# Patient Record
Sex: MALE | Employment: FULL TIME | URBAN - METROPOLITAN AREA
[De-identification: names, ages, dates, MRNs, and addresses within clinical notes are randomized per-mention and may not be internally consistent; named-entity substitution may affect disease eponyms.]

---

## 2020-07-09 ENCOUNTER — LAB VISIT (OUTPATIENT)
Dept: PRIMARY CARE CLINIC | Facility: OTHER | Age: 30
End: 2020-07-09
Attending: INTERNAL MEDICINE
Payer: COMMERCIAL

## 2020-07-09 DIAGNOSIS — R05.9 COUGH: ICD-10-CM

## 2020-07-09 PROCEDURE — U0003 INFECTIOUS AGENT DETECTION BY NUCLEIC ACID (DNA OR RNA); SEVERE ACUTE RESPIRATORY SYNDROME CORONAVIRUS 2 (SARS-COV-2) (CORONAVIRUS DISEASE [COVID-19]), AMPLIFIED PROBE TECHNIQUE, MAKING USE OF HIGH THROUGHPUT TECHNOLOGIES AS DESCRIBED BY CMS-2020-01-R: HCPCS

## 2020-07-12 LAB — SARS-COV-2 RNA RESP QL NAA+PROBE: NEGATIVE

## 2020-08-18 ENCOUNTER — OFFICE VISIT (OUTPATIENT)
Dept: URGENT CARE | Facility: CLINIC | Age: 30
End: 2020-08-18
Payer: COMMERCIAL

## 2020-08-18 VITALS
HEIGHT: 72 IN | DIASTOLIC BLOOD PRESSURE: 85 MMHG | SYSTOLIC BLOOD PRESSURE: 135 MMHG | WEIGHT: 210 LBS | BODY MASS INDEX: 28.44 KG/M2 | RESPIRATION RATE: 16 BRPM | HEART RATE: 68 BPM | TEMPERATURE: 98 F | OXYGEN SATURATION: 97 %

## 2020-08-18 DIAGNOSIS — S39.94XA INJURY TO PENIS, INITIAL ENCOUNTER: ICD-10-CM

## 2020-08-18 DIAGNOSIS — S30.21XA CONTUSION OF PENIS, INITIAL ENCOUNTER: Primary | ICD-10-CM

## 2020-08-18 LAB
BILIRUB UR QL STRIP: NEGATIVE
GLUCOSE UR QL STRIP: NEGATIVE
KETONES UR QL STRIP: NEGATIVE
LEUKOCYTE ESTERASE UR QL STRIP: NEGATIVE
PH, POC UA: 5 (ref 5–8)
POC BLOOD, URINE: NEGATIVE
POC NITRATES, URINE: NEGATIVE
PROT UR QL STRIP: NEGATIVE
SP GR UR STRIP: 1.02 (ref 1–1.03)
UROBILINOGEN UR STRIP-ACNC: NORMAL (ref 0.3–2.2)

## 2020-08-18 PROCEDURE — 81003 URINALYSIS AUTO W/O SCOPE: CPT | Mod: QW,S$GLB,, | Performed by: PHYSICIAN ASSISTANT

## 2020-08-18 PROCEDURE — 81003 POCT URINALYSIS, DIPSTICK, AUTOMATED, W/O SCOPE: ICD-10-PCS | Mod: QW,S$GLB,, | Performed by: PHYSICIAN ASSISTANT

## 2020-08-18 PROCEDURE — 99202 PR OFFICE/OUTPT VISIT, NEW, LEVL II, 15-29 MIN: ICD-10-PCS | Mod: 25,S$GLB,, | Performed by: PHYSICIAN ASSISTANT

## 2020-08-18 PROCEDURE — 99202 OFFICE O/P NEW SF 15 MIN: CPT | Mod: 25,S$GLB,, | Performed by: PHYSICIAN ASSISTANT

## 2020-08-18 NOTE — PATIENT INSTRUCTIONS
1.  Take all medications as directed. If you have been prescribed antibiotics, make sure to complete them.   2.  Rest and keep yourself/patient well hydrated. For adults, it is recommended to drink at least 8-10 glasses of water daily.   3.  For patients above 6 months of age who are not allergic to and are not on anticoagulants, you can alternate Tylenol and Motrin every 4-6 hours for fever above 100.4F and/or pain.  For patients less than 6 months of age, allergic to or intolerant to NSAIDS, have gastritis, gastric ulcers, or history of GI bleeds, are pregnant, or are on anticoagulant therapy, you can take Tylenol every 4 hours as needed for fever above 100.4F and/or pain.   4. You should schedule a follow-up appointment with your Primary Care Provider/Pediatrician for recheck in 2-3 days or as directed at this visit.   5.  If your condition fails to improve in a timely manner, you should receive another evaluation by your Primary Care Provider/Pediatrician to discuss your concerns or return to urgent care for a recheck.  If your condition worsens at any time, you should report immediately to your nearest Emergency Department for further evaluation. **You must understand that you have received Urgent Care treatment only and that you may be released before all of your medical problems are known or treated. You, the patient, are responsible to arrange for follow-up care as instructed.         Soft Tissue Contusion  You have a contusion. This is also called a bruise. There is swelling and some bleeding under the skin. This injury generally takes a few days to a few weeks to heal.  During that time, the bruise will typically change in color from reddish, to purple-blue, to greenish-yellow, then to yellow-brown.  Home care  · Elevate the injured area to reduce pain and swelling. As much as possible, sit or lie down with the injured area raised about the level of your heart. This is especially important during the first  48 hours.  · Ice the injured area to help reduce pain and swelling. Wrap a cold source (ice pack or ice cubes in a plastic bag) in a thin towel. Apply to the bruised area for 20 minutes every 1 to 2 hours the first day. Continue this 3 to 4 times a day until the pain and swelling goes away.  · Unless another medication was prescribed, you can take acetaminophen, ibuprofen, or naproxen to control pain. (If you have chronic liver or kidney disease or ever had a stomach ulcer or GI bleeding, talk with your doctor before using these medicines.)  Follow up  Follow up with your health care provider or our staff as advised. Call if you are not better in 1 to 2 weeks.  When to seek medical advice   Call your health care provider right away if you have any of the following:  · Increased pain or swelling  · Bruise is on an arm or leg and arm or leg becomes cold, blue, numb or tingly  · Signs of infection: Warmth, drainage, or increased redness or pain around the contusion  · Inability to move the injured area or body part   · Bruise is near your eye and you have problems with your eyesight or eye   · Frequent bruising for unknown reasons  Date Last Reviewed: 4/29/2015  © 5577-1941 [employer] [address] [place] [address], [place] [address]. All rights reserved. This information is not intended as a substitute for professional medical care. Always follow your healthcare professional's instructions.

## 2020-08-18 NOTE — PROGRESS NOTES
Subjective:       Patient ID: Cassia Wagner II is a 29 y.o. male.    Vitals:  height is 6' (1.829 m) and weight is 95.3 kg (210 lb). His oral temperature is 98.1 °F (36.7 °C). His blood pressure is 135/85 and his pulse is 68. His respiration is 16 and oxygen saturation is 97%.     Chief Complaint: Penile injury    29-year-old male presents to clinic today with complaints of a bruise to his penis.  Patient states that he accidentally caught the tip of his penis with a weight while working out yesterday.  Patient denies any significant pain.  He also denies any blood in his urine.  Patient denies any other complaints at this time.    Trauma  The incident occurred 12 to 24 hours ago. Incident location: at gym. The injury mechanism was a direct blow. Context: weights. No protective equipment was used. The pain is mild. It is unlikely that a foreign body is present. Pertinent negatives include no abdominal pain, abnormal behavior, chest pain, coughing, difficulty breathing, fussiness, headaches, hearing loss, inability to bear weight, light-headedness, loss of consciousness, memory loss, nausea, neck pain, numbness, seizures, tingling, visual disturbance, vomiting or weakness. There have been no prior injuries to these areas. His tetanus status is UTD.       Constitution: Negative for chills and fever.   HENT: Negative for hearing loss.    Neck: Negative for neck pain and painful lymph nodes.   Cardiovascular: Negative for chest pain.   Respiratory: Negative for cough.    Gastrointestinal: Negative for abdominal pain, nausea, vomiting and diarrhea.   Genitourinary: Positive for genital trauma. Negative for dysuria, frequency, urgency, urine decreased, flank pain, bladder incontinence, bed wetting, hematuria, history of kidney stones, painful intercourse, genital sore, penile discharge, painful ejaculation, penile pain, penile swelling, scrotal swelling, testicular pain and pelvic pain.   Musculoskeletal: Negative for back  pain.   Skin: Negative for rash and lesion.   Neurological: Negative for light-headedness, headaches, loss of consciousness, numbness and seizures.   Hematologic/Lymphatic: Negative for swollen lymph nodes.       Objective:      Physical Exam   Constitutional: He is oriented to person, place, and time. He appears well-developed. No distress.   HENT:   Head: Normocephalic and atraumatic.   Ears:   Right Ear: External ear normal.   Left Ear: External ear normal.   Nose: Nose normal. No nasal deformity. No epistaxis.   Mouth/Throat: Oropharynx is clear and moist and mucous membranes are normal.   Eyes: Conjunctivae and lids are normal.   Neck: Trachea normal, normal range of motion and phonation normal. Neck supple.   Cardiovascular: Normal rate, regular rhythm and normal heart sounds.   Pulmonary/Chest: Effort normal and breath sounds normal.   Abdominal: Soft. Normal appearance and bowel sounds are normal. He exhibits no distension. There is no abdominal tenderness.   Genitourinary:    Testes normal.   Right testis shows no mass, no swelling and no tenderness. Left testis shows no mass, no swelling and no tenderness. Uncircumcised. No phimosis, paraphimosis, hypospadias, penile erythema, penile tenderness or penile swelling. Penis exhibits no lesions. No discharge found.         Musculoskeletal: Normal range of motion.   Neurological: He is alert and oriented to person, place, and time. He has normal reflexes.   Skin: Skin is warm, dry, intact and not diaphoretic. Psychiatric: His speech is normal and behavior is normal. Judgment and thought content normal.   Nursing note and vitals reviewed.        Results for orders placed or performed in visit on 08/18/20   POCT Urinalysis, Dipstick, Automated, W/O Scope   Result Value Ref Range    POC Blood, Urine Negative Negative    POC Bilirubin, Urine Negative Negative    POC Urobilinogen, Urine normal 0.3 - 2.2    POC Ketones, Urine Negative Negative    POC Protein, Urine  Negative Negative    POC Nitrates, Urine Negative Negative    POC Glucose, Urine Negative Negative    pH, UA 5.0 5 - 8    POC Specific Gravity, Urine 1.020 1.003 - 1.029    POC Leukocytes, Urine Negative Negative       Assessment:       1. Contusion of penis, initial encounter    2. Injury to penis, initial encounter        Plan:         Contusion of penis, initial encounter    Injury to penis, initial encounter  -     POCT Urinalysis, Dipstick, Automated, W/O Scope    All point of care testing performed at this visit reviewed by me prior to patient discharge.    Patient Instructions   1.  Take all medications as directed. If you have been prescribed antibiotics, make sure to complete them.   2.  Rest and keep yourself/patient well hydrated. For adults, it is recommended to drink at least 8-10 glasses of water daily.   3.  For patients above 6 months of age who are not allergic to and are not on anticoagulants, you can alternate Tylenol and Motrin every 4-6 hours for fever above 100.4F and/or pain.  For patients less than 6 months of age, allergic to or intolerant to NSAIDS, have gastritis, gastric ulcers, or history of GI bleeds, are pregnant, or are on anticoagulant therapy, you can take Tylenol every 4 hours as needed for fever above 100.4F and/or pain.   4. You should schedule a follow-up appointment with your Primary Care Provider/Pediatrician for recheck in 2-3 days or as directed at this visit.   5.  If your condition fails to improve in a timely manner, you should receive another evaluation by your Primary Care Provider/Pediatrician to discuss your concerns or return to urgent care for a recheck.  If your condition worsens at any time, you should report immediately to your nearest Emergency Department for further evaluation. **You must understand that you have received Urgent Care treatment only and that you may be released before all of your medical problems are known or treated. You, the patient, are  responsible to arrange for follow-up care as instructed.         Soft Tissue Contusion  You have a contusion. This is also called a bruise. There is swelling and some bleeding under the skin. This injury generally takes a few days to a few weeks to heal.  During that time, the bruise will typically change in color from reddish, to purple-blue, to greenish-yellow, then to yellow-brown.  Home care  · Elevate the injured area to reduce pain and swelling. As much as possible, sit or lie down with the injured area raised about the level of your heart. This is especially important during the first 48 hours.  · Ice the injured area to help reduce pain and swelling. Wrap a cold source (ice pack or ice cubes in a plastic bag) in a thin towel. Apply to the bruised area for 20 minutes every 1 to 2 hours the first day. Continue this 3 to 4 times a day until the pain and swelling goes away.  · Unless another medication was prescribed, you can take acetaminophen, ibuprofen, or naproxen to control pain. (If you have chronic liver or kidney disease or ever had a stomach ulcer or GI bleeding, talk with your doctor before using these medicines.)  Follow up  Follow up with your health care provider or our staff as advised. Call if you are not better in 1 to 2 weeks.  When to seek medical advice   Call your health care provider right away if you have any of the following:  · Increased pain or swelling  · Bruise is on an arm or leg and arm or leg becomes cold, blue, numb or tingly  · Signs of infection: Warmth, drainage, or increased redness or pain around the contusion  · Inability to move the injured area or body part   · Bruise is near your eye and you have problems with your eyesight or eye   · Frequent bruising for unknown reasons  Date Last Reviewed: 4/29/2015  © 3186-8181 [email or web address] [address] [place] [address], [place] [address]. All rights reserved. This information is not intended as a substitute for professional  medical care. Always follow your healthcare professional's instructions.

## 2020-08-18 NOTE — LETTER
August 18, 2020      [facility]  [address], [address]  [place] [address]  Phone: [phone]  Fax: [phone]       Patient: Cassia Wagner   YOB: 1990  Date of Visit: 08/18/2020    To Whom It May Concern:    Yoli Wagner  was at [facility] on 08/18/2020. He may return to work/school on 08/19/2020 with no restrictions. If you have any questions or concerns, or if I can be of further assistance, please do not hesitate to contact me.    Sincerely,      Yasmeen San PA-C